# Patient Record
Sex: MALE | Race: WHITE
[De-identification: names, ages, dates, MRNs, and addresses within clinical notes are randomized per-mention and may not be internally consistent; named-entity substitution may affect disease eponyms.]

---

## 2017-02-08 PROBLEM — Z00.00 ENCOUNTER FOR PREVENTIVE HEALTH EXAMINATION: Status: ACTIVE | Noted: 2017-02-08

## 2017-02-16 ENCOUNTER — APPOINTMENT (OUTPATIENT)
Dept: VASCULAR SURGERY | Facility: CLINIC | Age: 72
End: 2017-02-16

## 2017-02-16 VITALS
SYSTOLIC BLOOD PRESSURE: 121 MMHG | OXYGEN SATURATION: 94 % | HEIGHT: 72 IN | WEIGHT: 255 LBS | BODY MASS INDEX: 34.54 KG/M2 | HEART RATE: 75 BPM | DIASTOLIC BLOOD PRESSURE: 79 MMHG

## 2017-02-16 DIAGNOSIS — R26.2 DIFFICULTY IN WALKING, NOT ELSEWHERE CLASSIFIED: ICD-10-CM

## 2017-02-16 DIAGNOSIS — M79.604 PAIN IN RIGHT LEG: ICD-10-CM

## 2017-02-16 DIAGNOSIS — M79.605 PAIN IN RIGHT LEG: ICD-10-CM

## 2017-02-21 PROBLEM — M79.604 PAIN IN BOTH LOWER EXTREMITIES: Status: RESOLVED | Noted: 2017-02-16 | Resolved: 2017-02-21

## 2017-02-21 PROBLEM — M79.604 LEG PAIN, BILATERAL: Status: ACTIVE | Noted: 2017-02-21

## 2017-02-21 PROBLEM — R26.2 DIFFICULTY WALKING: Status: ACTIVE | Noted: 2017-02-16

## 2017-02-21 RX ORDER — METOPROLOL TARTRATE 75 MG/1
TABLET, FILM COATED ORAL
Refills: 0 | Status: ACTIVE | COMMUNITY

## 2017-02-21 RX ORDER — ROSUVASTATIN CALCIUM 5 MG/1
TABLET, FILM COATED ORAL
Refills: 0 | Status: ACTIVE | COMMUNITY

## 2017-02-21 RX ORDER — ASPIRIN 81 MG
81 TABLET, DELAYED RELEASE (ENTERIC COATED) ORAL
Refills: 0 | Status: ACTIVE | COMMUNITY

## 2017-02-21 RX ORDER — FUROSEMIDE 80 MG/1
TABLET ORAL
Refills: 0 | Status: ACTIVE | COMMUNITY

## 2019-02-19 ENCOUNTER — APPOINTMENT (OUTPATIENT)
Dept: ORTHOPEDIC SURGERY | Facility: CLINIC | Age: 74
End: 2019-02-19
Payer: MEDICARE

## 2019-02-19 VITALS
HEART RATE: 85 BPM | SYSTOLIC BLOOD PRESSURE: 132 MMHG | BODY MASS INDEX: 37.65 KG/M2 | DIASTOLIC BLOOD PRESSURE: 85 MMHG | HEIGHT: 72 IN | WEIGHT: 278 LBS

## 2019-02-19 DIAGNOSIS — Z96.653 PAIN DUE TO INTERNAL ORTHOPEDIC PROSTHETIC DEVICES, IMPLANTS AND GRAFTS, INITIAL ENCOUNTER: ICD-10-CM

## 2019-02-19 DIAGNOSIS — T84.84XA PAIN DUE TO INTERNAL ORTHOPEDIC PROSTHETIC DEVICES, IMPLANTS AND GRAFTS, INITIAL ENCOUNTER: ICD-10-CM

## 2019-02-19 PROCEDURE — 99204 OFFICE O/P NEW MOD 45 MIN: CPT

## 2019-02-19 PROCEDURE — 73564 X-RAY EXAM KNEE 4 OR MORE: CPT | Mod: 50

## 2019-02-19 NOTE — DISCUSSION/SUMMARY
[de-identified] : This patient has bilateral knee pain which is only mild but his biggest prominent is weakness in the bilateral lower extremities.  I strongly reinforced that physical therapy is very important for knee range of motion and strengthening and gait training for him.  I have a low suspicion for an infection of his bilateral knee replacements.  There is no evidence of failure of his bilateral total knee arthroplasties at this time.  He will continue to work on improving his lower extremity swelling.  If he continues to have symptoms after 2-3 months of physical therapy then he should return for follow-up and we will obtain further more advanced imaging of the bilateral lower extremities.  Physical therapy was prescribed.

## 2019-02-19 NOTE — HISTORY OF PRESENT ILLNESS
[de-identified] : This is very nice 73-year-old gentleman experiencing bilateral knee pain, which is mild in intensity.  His biggest complaint is weakness in the bilateral lower extremities and difficulty rising from a chair.  The pain mildly limits activities of daily living. Walking tolerance is not reduced.  He ambulates with a cane or walker.  He has had difficulty ambulating for more than 3 years.  He has been managing his bilateral lower extremity peripheral edema with Dr. Al which is now improving with Lasix and lower extremity compression wrapping.  He underwent bilateral total knee arthroplasty 5 years ago and 3 years ago by Dr. Bull at [facility].  He recovered very well from these surgeries.  He denies any fevers or chills.  He has no pain with bearing weight on the bilateral legs.  The pain does not radiate down his legs and it is not associated with numbness or tingling.  He does have bilateral lower extremity weakness.  He has not tried physical therapy for this.

## 2019-02-19 NOTE — PHYSICAL EXAM
[de-identified] : Patient is well nourished, well-developed, in no acute distress, with appropriate mood and affect. The patient is oriented to time, place, and person. Respirations are even and unlabored. Gait evaluation does reveal a limp. There is no inguinal adenopathy. The right limb is well-perfused and showed 2+ dp/pt pulses, with a well-healed midline surgical scar, shows a grossly normal motor and sensory examination. Right knee motion is painless and the knee moves from 0-125 degrees. The knee is stable within that range-of-motion to AP and ML stress and no instability to varus or valgus stress. The alignment of the knee is neutral. No effusion or crepitus is noted. No tenderness to palpation about the medial or lateral joint line, medial or lateral tibial plateau, medial or lateral femoral condyle, medial or lateral patellar facets, superior or inferior pole of the patella. Muscle strength is normal. Pedal pulses are palpable. Hip examination was negative. The left limb is well-perfused and showed 2+ dp/pt pulses, with a well-healed midline surgical scar, shows a grossly normal motor and sensory examination. Left knee motion is painless and the knee moves from 0-125 degrees. The knee is stable within that range-of-motion to AP and ML stress with a 1A Lachman, negative anterior or posterior drawer and no instability to varus or valgus stress. The alignment of the knee is neutral. No effusion or crepitus is noted. No tenderness to palpation about the medial or lateral joint line, medial or lateral tibial plateau, medial or lateral femoral condyle, medial or lateral patellar facets, superior or inferior pole of the patella. Muscle strength is normal. Pedal pulses are palpable. Hip examination was negative. [de-identified] : AP and lateral knee x-rays of the bilateral knee were reviewed. Satisfactory position and alignment of the components are present. No signs of loosening are seen.

## 2019-02-19 NOTE — REASON FOR VISIT
[Initial Visit] : an initial visit for [Artificial Knee Joint] : artificial knee joint [Knee Pain] : knee pain

## 2019-02-19 NOTE — CONSULT LETTER
[Dear  ___] : Dear  [unfilled], [Consult Letter:] : I had the pleasure of evaluating your patient, [unfilled]. [( Thank you for referring [unfilled] for consultation for _____ )] : Thank you for referring [unfilled] for consultation for [unfilled] [Please see my note below.] : Please see my note below. [Consult Closing:] : Thank you very much for allowing me to participate in the care of this patient.  If you have any questions, please do not hesitate to contact me. [Sincerely,] : Sincerely, [FreeTextEntry3] : Sincerely,\par \par Jhonny Sanchez M.D.\par Adult Joint Reconstruction Orthopedic Surgeon\par Department of Orthopaedic Surgery\par [facility]

## 2023-05-15 RX ORDER — FUROSEMIDE 80 MG
80 TABLET ORAL DAILY
COMMUNITY

## 2023-05-15 RX ORDER — ASPIRIN 81 MG/1
81 TABLET ORAL DAILY
COMMUNITY

## 2023-05-15 RX ORDER — METOPROLOL SUCCINATE 50 MG/1
112 TABLET, EXTENDED RELEASE ORAL DAILY
COMMUNITY

## 2023-05-15 RX ORDER — METOLAZONE 5 MG/1
5 TABLET ORAL EVERY OTHER DAY
COMMUNITY

## 2023-10-19 ENCOUNTER — APPOINTMENT (OUTPATIENT)
Dept: UROLOGY | Facility: CLINIC | Age: 78
End: 2023-10-19

## 2023-10-19 DIAGNOSIS — R31.0 GROSS HEMATURIA: ICD-10-CM

## 2023-10-19 DIAGNOSIS — N40.1 BENIGN PROSTATIC HYPERPLASIA WITH LOWER URINARY TRACT SYMPMS: ICD-10-CM
